# Patient Record
Sex: FEMALE | Race: AMERICAN INDIAN OR ALASKA NATIVE
[De-identification: names, ages, dates, MRNs, and addresses within clinical notes are randomized per-mention and may not be internally consistent; named-entity substitution may affect disease eponyms.]

---

## 2018-09-25 ENCOUNTER — HOSPITAL ENCOUNTER (INPATIENT)
Dept: HOSPITAL 5 - ED | Age: 48
LOS: 3 days | Discharge: HOME | DRG: 683 | End: 2018-09-28
Attending: INTERNAL MEDICINE | Admitting: INTERNAL MEDICINE
Payer: COMMERCIAL

## 2018-09-25 DIAGNOSIS — I34.1: ICD-10-CM

## 2018-09-25 DIAGNOSIS — I10: ICD-10-CM

## 2018-09-25 DIAGNOSIS — Z90.49: ICD-10-CM

## 2018-09-25 DIAGNOSIS — Z79.899: ICD-10-CM

## 2018-09-25 DIAGNOSIS — Z90.710: ICD-10-CM

## 2018-09-25 DIAGNOSIS — K59.00: ICD-10-CM

## 2018-09-25 DIAGNOSIS — Z79.82: ICD-10-CM

## 2018-09-25 DIAGNOSIS — Z79.84: ICD-10-CM

## 2018-09-25 DIAGNOSIS — N17.0: Primary | ICD-10-CM

## 2018-09-25 DIAGNOSIS — E86.9: ICD-10-CM

## 2018-09-25 DIAGNOSIS — R19.7: ICD-10-CM
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
## 2018-09-25 DIAGNOSIS — R65.10: ICD-10-CM

## 2018-09-25 DIAGNOSIS — N28.1: ICD-10-CM

## 2018-09-25 DIAGNOSIS — E24.9: ICD-10-CM

## 2018-09-25 DIAGNOSIS — T48.905A: ICD-10-CM

## 2018-09-25 LAB
ALBUMIN SERPL-MCNC: 4.1 G/DL (ref 3.9–5)
ALT SERPL-CCNC: 10 UNITS/L (ref 7–56)
BASOPHILS # (AUTO): 0.1 K/MM3 (ref 0–0.1)
BASOPHILS NFR BLD AUTO: 0.4 % (ref 0–1.8)
BUN SERPL-MCNC: 29 MG/DL (ref 7–17)
BUN/CREAT SERPL: 18 %
CALCIUM SERPL-MCNC: 9.4 MG/DL (ref 8.4–10.2)
EOSINOPHIL # BLD AUTO: 0.1 K/MM3 (ref 0–0.4)
EOSINOPHIL NFR BLD AUTO: 0.6 % (ref 0–4.3)
HCT VFR BLD CALC: 44.5 % (ref 30.3–42.9)
HEMOLYSIS INDEX: 13
HGB BLD-MCNC: 14.5 GM/DL (ref 10.1–14.3)
LYMPHOCYTES # BLD AUTO: 2.1 K/MM3 (ref 1.2–5.4)
LYMPHOCYTES NFR BLD AUTO: 14.9 % (ref 13.4–35)
MCH RBC QN AUTO: 29 PG (ref 28–32)
MCHC RBC AUTO-ENTMCNC: 33 % (ref 30–34)
MCV RBC AUTO: 88 FL (ref 79–97)
MONOCYTES # (AUTO): 0.6 K/MM3 (ref 0–0.8)
MONOCYTES % (AUTO): 4.3 % (ref 0–7.3)
PLATELET # BLD: 253 K/MM3 (ref 140–440)
RBC # BLD AUTO: 5.07 M/MM3 (ref 3.65–5.03)

## 2018-09-25 PROCEDURE — 84443 ASSAY THYROID STIM HORMONE: CPT

## 2018-09-25 PROCEDURE — 76770 US EXAM ABDO BACK WALL COMP: CPT

## 2018-09-25 PROCEDURE — 82962 GLUCOSE BLOOD TEST: CPT

## 2018-09-25 PROCEDURE — 82550 ASSAY OF CK (CPK): CPT

## 2018-09-25 PROCEDURE — 85025 COMPLETE CBC W/AUTO DIFF WBC: CPT

## 2018-09-25 PROCEDURE — 74176 CT ABD & PELVIS W/O CONTRAST: CPT

## 2018-09-25 PROCEDURE — 80053 COMPREHEN METABOLIC PANEL: CPT

## 2018-09-25 PROCEDURE — 96361 HYDRATE IV INFUSION ADD-ON: CPT

## 2018-09-25 PROCEDURE — 84300 ASSAY OF URINE SODIUM: CPT

## 2018-09-25 PROCEDURE — 82553 CREATINE MB FRACTION: CPT

## 2018-09-25 PROCEDURE — 36415 COLL VENOUS BLD VENIPUNCTURE: CPT

## 2018-09-25 PROCEDURE — 96375 TX/PRO/DX INJ NEW DRUG ADDON: CPT

## 2018-09-25 PROCEDURE — 81001 URINALYSIS AUTO W/SCOPE: CPT

## 2018-09-25 PROCEDURE — 89050 BODY FLUID CELL COUNT: CPT

## 2018-09-25 PROCEDURE — 80048 BASIC METABOLIC PNL TOTAL CA: CPT

## 2018-09-25 PROCEDURE — 93010 ELECTROCARDIOGRAM REPORT: CPT

## 2018-09-25 PROCEDURE — 74018 RADEX ABDOMEN 1 VIEW: CPT

## 2018-09-25 PROCEDURE — 82570 ASSAY OF URINE CREATININE: CPT

## 2018-09-25 PROCEDURE — 83690 ASSAY OF LIPASE: CPT

## 2018-09-25 PROCEDURE — 84484 ASSAY OF TROPONIN QUANT: CPT

## 2018-09-25 PROCEDURE — 93005 ELECTROCARDIOGRAM TRACING: CPT

## 2018-09-25 PROCEDURE — 87040 BLOOD CULTURE FOR BACTERIA: CPT

## 2018-09-25 PROCEDURE — 96374 THER/PROPH/DIAG INJ IV PUSH: CPT

## 2018-09-25 NOTE — CAT SCAN REPORT
FINAL REPORT



PROCEDURE:  CT ABDOMEN PELVIS WO CON



TECHNIQUE:  Computerized axial tomography of the abdomen and

pelvis was performed without intravenous contrast. This study is

performed without intravascular contrast material and its

sensitivity for abdominal and pelvic pathology, including

neoplasms, inflammation, abscess, free fluid, thrombosis,

arterial dissection and infarction, is reduced compared with a

contrast enhanced study. 



HISTORY:  n,v,d recent constipation 



COMPARISON:  No prior studies are available for comparison.



FINDINGS:  

Visualized lower thorax: There is asymmetry of the breast tissue,

possibly just incompletely imaged.



Liver: There is lobular somewhat ovoid low-attenuation at the

hepatic dome, measuring up to 2.5 centimeters, possibly a cyst.



Spleen: Normal size and attenuation.



Gallbladder and biliary system: Gallbladder is distended. The

common bile duct measures up to 7 millimeters in caliber.



Pancreas: Normal.



Adrenals: Normal.



Kidneys: 16 millimeter low-density lesion in the left is kidney

is likely a cyst.



GI tract: Air-fluid levels in the colon. Appendix is not

visualized. No bowel obstruction or inflammation.



Lymph nodes and mesentery: Normal.



Vasculature: IVC filter is present.



Bladder: Normal.



Reproductive organs: Uterus is not visualized.



Peritoneum: Small amount of fluid is seen in the pelvis.



Musculoskeletal structures: There is a healed fracture of the

right inferior pubic ramus. There is a compression fracture of

L1, which appears chronic 



Other: None.



IMPRESSION:  

Air-fluid levels in the colon can be seen with diarrhea producing

illness. No bowel obstruction or inflammation.



Gallbladder distention.



Asymmetric breast tissue. This could be due to incomplete imaging

of one of the breasts. Correlate clinically.

## 2018-09-25 NOTE — EMERGENCY DEPARTMENT REPORT
ED N/V/D HPI





- General


Chief complaint: Nausea/Vomiting/Diarrhea


Stated complaint: LOW BP/NAUSEA/VOMITING


Time Seen by Provider: 09/25/18 16:27


Source: patient, EMS


Mode of arrival: Stretcher


Limitations: No Limitations





- History of Present Illness


Initial comments: 





48-year-old female with a past medical history hypertension, mitral valve 

prolapse, Cushing syndrome, transverse myelitis, and previous surgical history 

of pituitary gland removal, hysterectomy, and appendectomy presents to the 

hospital complains of nausea, vomiting, diarrhea, and by mouth intolerance 

since 5 AM.  Patient states she suffers from chronic nausea and chronic 

constipation.  She has been told to have constipation with secondary to nerve 

dysfunction after her transverse myelitis infection in 2002.  Patient was seen 

at another ER 1 week ago for constipation and nausea without vomiting.  Had a 

CAT scan performed but can't remember if she received contrast. CT + for 

constipation. She felt better after Zofran and an enema.  For the last week she'

s been using MiraLAX, mag citrate, and Colace.  She also uses Zofran 

intermittently with minimal improvement has been trying over-the-counter nausea 

medication.  Upon EMS arrival patient was alert but hypotensive with a pressure 

80/50 in Accu-Chek of 120.  Patient denies melena, hematochezia, hematemesis, 

or fever.  She has generalized intermittent sharp and cramping abdominal pain 

worse with palpation.  She does have a GI doctor with last colonoscopy 2 years 

ago which was unremarkable as per patient.  Patient has a history of 

hypertension but has been noncompliant with her meds for several months.





- Related Data


 Home Medications











 Medication  Instructions  Recorded  Confirmed  Last Taken


 


Aspirin [Aspirin TAB] 325 mg PO QDAY #0 05/11/15 09/26/18 Unknown


 


AtorvaSTATin [Lipitor] 40 mg PO QHS #0 05/11/15 09/26/18 Unknown


 


Duloxetine HCl [DULoxetine] 60 mg PO DAILY #0 05/11/15 09/26/18 Unknown


 


Furosemide [Lasix TAB] 40 mg PO QDAY PRN #0 05/11/15 09/26/18 Unknown


 


Iron Fum,Ps/Folic/Bcomp,C No.9 1 each PO DAILY #0 05/11/15 09/26/18 Unknown





[Integra Plus Capsule]    


 


Levothyroxine [Synthroid] 100 mcg PO QAM #0 05/11/15 09/26/18 Unknown


 


Metformin HCl 1,000 mg PO DAILY #0 05/11/15 09/26/18 Unknown


 


Metoprolol [Lopressor TAB] 50 mg PO BID #0 05/11/15 09/26/18 Unknown


 


Omeprazole 40 mg PO DAILY #0 05/11/15 09/26/18 Unknown


 


Pregabalin [Lyrica] 100 mg PO TID #0 05/11/15 09/26/18 Unknown











 Allergies











Allergy/AdvReac Type Severity Reaction Status Date / Time


 


No Known Allergies Allergy   Unverified 05/11/15 14:49














ED Review of Systems


ROS: 


Stated complaint: LOW BP/NAUSEA/VOMITING


Other details as noted in HPI





Comment: All other systems reviewed and negative





ED Past Medical Hx





- Past Medical History


Previous Medical History?: Yes


Hx Hypertension: Yes


Additional medical history: MVP, Cushings, Tranverse Myelitis,





- Surgical History


Hx Pacemaker: No


Hx Appendectomy: Yes


Additional Surgical History: Pituitary Gland, hysterectomy





- Social History


Smoking Status: Never Smoker


Substance Use Type: None





- Medications


Home Medications: 


 Home Medications











 Medication  Instructions  Recorded  Confirmed  Last Taken  Type


 


Aspirin [Aspirin TAB] 325 mg PO QDAY #0 05/11/15 09/26/18 Unknown History


 


AtorvaSTATin [Lipitor] 40 mg PO QHS #0 05/11/15 09/26/18 Unknown History


 


Duloxetine HCl [DULoxetine] 60 mg PO DAILY #0 05/11/15 09/26/18 Unknown History


 


Furosemide [Lasix TAB] 40 mg PO QDAY PRN #0 05/11/15 09/26/18 Unknown History


 


Iron Fum,Ps/Folic/Bcomp,C No.9 1 each PO DAILY #0 05/11/15 09/26/18 Unknown 

History





[Integra Plus Capsule]     


 


Levothyroxine [Synthroid] 100 mcg PO QAM #0 05/11/15 09/26/18 Unknown History


 


Metformin HCl 1,000 mg PO DAILY #0 05/11/15 09/26/18 Unknown History


 


Metoprolol [Lopressor TAB] 50 mg PO BID #0 05/11/15 09/26/18 Unknown History


 


Omeprazole 40 mg PO DAILY #0 05/11/15 09/26/18 Unknown History


 


Pregabalin [Lyrica] 100 mg PO TID #0 05/11/15 09/26/18 Unknown History














ED Physical Exam





- General


Limitations: No Limitations





- Other


Other exam information: 





General: No limitations, patient is alert in no acute distress


Head exam: Atraumatic, normocephalic


Eyes exam: Normal appearance, pupils equal reactive to light, extraocular 

movements intact


ENT: Moist mucous membrane, normal oropharynx


Neck exam: Normal inspection, full range of motion, no meningismus nontender


Respiratory exam: Clear to auscultation bilateral, no wheezes, rales, crackles


Cardiovascular: Normal rate and rhythm, normal heart sounds


Abdomen: Soft, nondistended, generalized abdominal tenderness, with normal 

bowel sounds, no rebound, or guarding


Extremity: Full range of motion normal inspection no deformity


Back: Normal Inspection, full range of motion, no tenderness


Neurologic: Alert, oriented x3, cranial nerves intact, no motor or sensory 

deficit


Psychiatric: normal affect, normal mood


Skin: Warm, dry, intact





ED Course


 Vital Signs











  09/25/18 09/25/18 09/25/18





  16:14 17:30 17:48


 


Temperature 97.7 F  97.8 F


 


Pulse Rate 103 H  85


 


Respiratory 16 16 18





Rate   


 


Blood Pressure 108/62  


 


Blood Pressure   93/57





[Right]   


 


O2 Sat by Pulse 97 98 95





Oximetry   














  09/25/18 09/25/18 09/25/18





  18:44 20:18 20:30


 


Temperature   


 


Pulse Rate 81  


 


Respiratory 16  





Rate   


 


Blood Pressure   88/68


 


Blood Pressure 91/62  





[Right]   


 


O2 Sat by Pulse 97 82 L 100





Oximetry   














  09/25/18 09/25/18 09/25/18





  20:45 21:00 21:15


 


Temperature   


 


Pulse Rate   


 


Respiratory   





Rate   


 


Blood Pressure 92/58 92/58 92/56


 


Blood Pressure   





[Right]   


 


O2 Sat by Pulse 93 92 95





Oximetry   














  09/25/18 09/25/18 09/25/18





  21:30 21:45 22:00


 


Temperature   


 


Pulse Rate   


 


Respiratory   





Rate   


 


Blood Pressure 92/56 99/54 99/54


 


Blood Pressure   





[Right]   


 


O2 Sat by Pulse 93 92 95





Oximetry   














  09/25/18 09/25/18 09/25/18





  22:15 22:30 22:45


 


Temperature   


 


Pulse Rate   


 


Respiratory   





Rate   


 


Blood Pressure   90/50


 


Blood Pressure   





[Right]   


 


O2 Sat by Pulse 99 94 94





Oximetry   














  09/25/18 09/25/18 09/25/18





  23:00 23:15 23:30


 


Temperature   


 


Pulse Rate   


 


Respiratory 16  





Rate   


 


Blood Pressure 90/50  84/54


 


Blood Pressure 88/46  





[Right]   


 


O2 Sat by Pulse 95 97 





Oximetry   














  09/25/18 09/25/18 09/26/18





  23:45 23:59 00:00


 


Temperature   


 


Pulse Rate   


 


Respiratory   





Rate   


 


Blood Pressure 105/47 105/47 90/56


 


Blood Pressure   





[Right]   


 


O2 Sat by Pulse 92 96 93





Oximetry   














  09/26/18 09/26/18 09/26/18





  00:10 00:20 00:30


 


Temperature   


 


Pulse Rate   


 


Respiratory   





Rate   


 


Blood Pressure 84/54 96/52 91/56


 


Blood Pressure   





[Right]   


 


O2 Sat by Pulse 93 95 99





Oximetry   














ED Medical Decision Making





- Lab Data


Result diagrams: 


 09/25/18 17:24





 09/26/18 06:09








 Lab Results











  09/25/18 09/25/18 Range/Units





  17:24 17:24 


 


WBC  14.0 H   (4.5-11.0)  K/mm3


 


RBC  5.07 H   (3.65-5.03)  M/mm3


 


Hgb  14.5 H   (10.1-14.3)  gm/dl


 


Hct  44.5 H   (30.3-42.9)  %


 


MCV  88   (79-97)  fl


 


MCH  29   (28-32)  pg


 


MCHC  33   (30-34)  %


 


RDW  14.7   (13.2-15.2)  %


 


Plt Count  253   (140-440)  K/mm3


 


Lymph % (Auto)  14.9   (13.4-35.0)  %


 


Mono % (Auto)  4.3   (0.0-7.3)  %


 


Eos % (Auto)  0.6   (0.0-4.3)  %


 


Baso % (Auto)  0.4   (0.0-1.8)  %


 


Lymph #  2.1   (1.2-5.4)  K/mm3


 


Mono #  0.6   (0.0-0.8)  K/mm3


 


Eos #  0.1   (0.0-0.4)  K/mm3


 


Baso #  0.1   (0.0-0.1)  K/mm3


 


Seg Neutrophils %  79.8 H   (40.0-70.0)  %


 


Seg Neutrophils #  11.2 H   (1.8-7.7)  K/mm3


 


Sodium   145  (137-145)  mmol/L


 


Potassium   4.0  (3.6-5.0)  mmol/L


 


Chloride   107.7 H  ()  mmol/L


 


Carbon Dioxide   25  (22-30)  mmol/L


 


Anion Gap   16  mmol/L


 


BUN   29 H  (7-17)  mg/dL


 


Creatinine   1.6 H  (0.7-1.2)  mg/dL


 


Estimated GFR   42  ml/min


 


BUN/Creatinine Ratio   18  %


 


Glucose   86  ()  mg/dL


 


Calcium   9.4  (8.4-10.2)  mg/dL


 


Total Bilirubin   0.40  (0.1-1.2)  mg/dL


 


AST   19  (5-40)  units/L


 


ALT   10  (7-56)  units/L


 


Alkaline Phosphatase   49  ()  units/L


 


Total Creatine Kinase   145 H  ()  units/L


 


CK-MB (CK-2)   2.5  (0.0-4.0)  ng/mL


 


CK-MB (CK-2) Rel Index   1.7  (0-4)  


 


Troponin T   < 0.010  (0.00-0.029)  ng/mL


 


Total Protein   7.2  (6.3-8.2)  g/dL


 


Albumin   4.1  (3.9-5)  g/dL


 


Albumin/Globulin Ratio   1.3  %














- Radiology Data


Radiology results: report reviewed








FINAL REPORT 





PROCEDURE: CT ABDOMEN PELVIS WO CON 





TECHNIQUE: Computerized axial tomography of the abdomen and 


pelvis was performed without intravenous contrast. This study is 


performed without intravascular contrast material and its 


sensitivity for abdominal and pelvic pathology, including 


neoplasms, inflammation, abscess, free fluid, thrombosis, 


arterial dissection and infarction, is reduced compared with a 


contrast enhanced study. 





HISTORY: n,v,d recent constipation 





COMPARISON: No prior studies are available for comparison. 





FINDINGS: 


Visualized lower thorax: There is asymmetry of the breast tissue, 


possibly just incompletely imaged. 





Liver: There is lobular somewhat ovoid low-attenuation at the 


hepatic dome, measuring up to 2.5 centimeters, possibly a cyst. 





Spleen: Normal size and attenuation. 





Gallbladder and biliary system: Gallbladder is distended. The 


common bile duct measures up to 7 millimeters in caliber. 





Pancreas: Normal. 





Adrenals: Normal. 





Kidneys: 16 millimeter low-density lesion in the left is kidney 


is likely a cyst. 





GI tract: Air-fluid levels in the colon. Appendix is not 


visualized. No bowel obstruction or inflammation. 





Lymph nodes and mesentery: Normal. 





Vasculature: IVC filter is present. 





Bladder: Normal. 





Reproductive organs: Uterus is not visualized. 





Peritoneum: Small amount of fluid is seen in the pelvis. 





Musculoskeletal structures: There is a healed fracture of the 


right inferior pubic ramus. There is a compression fracture of 


L1, which appears chronic 





Other: None. 





IMPRESSION: 


Air-fluid levels in the colon can be seen with diarrhea producing 


illness. No bowel obstruction or inflammation. 





Gallbladder distention. 





Asymmetric breast tissue. This could be due to incomplete imaging 


of one of the breasts. Correlate clinically. 








- Medical Decision Making





BP improved with IV fluids but systolic drop below 100 after morphine.  MAP 

remains above 65.  Additional normal saline ordered.  Vomiting controlled with 

Zofran.  Patient denies history of renal insufficiency status kidney problems 

and therefore likely has new onset renal insufficiency possibly due to acute 

dehydration from vomiting loss.  Patient is unsure if she received IV contrast.

  Patient signed out to Dr. Humphrey to follow-up CT and then to admit patient to 

hospitalist unless she is a candidate for surgical admission





- Differential Diagnosis


gastroenteritis, colitis, dehydration, anemia, sepsis


Critical Care Time: No


Critical care attestation.: 


If time is entered above; I have spent that time in minutes in the direct care 

of this critically ill patient, excluding procedure time.








ED Disposition


Clinical Impression: 


 Vomiting, Acute diarrhea, Stimulant laxative causing adverse effect in 

therapeutic use, Acute dehydration, Acute renal insufficiency





Disposition: DC-09 OP ADMIT IP TO THIS HOSP


Is pt being admited?: Yes


Condition: Stable


Time of Disposition: 19:56 (s/o to Dr Humphrey f/u ct, admit)

## 2018-09-26 LAB
BUN SERPL-MCNC: 24 MG/DL (ref 7–17)
BUN/CREAT SERPL: 15 %
CALCIUM SERPL-MCNC: 8 MG/DL (ref 8.4–10.2)
HEMOLYSIS INDEX: 4

## 2018-09-26 RX ADMIN — PREGABALIN SCH MG: 25 CAPSULE ORAL at 21:50

## 2018-09-26 RX ADMIN — SODIUM CHLORIDE SCH MLS/HR: 0.9 INJECTION, SOLUTION INTRAVENOUS at 01:20

## 2018-09-26 RX ADMIN — HEPARIN SODIUM SCH UNIT: 5000 INJECTION, SOLUTION INTRAVENOUS; SUBCUTANEOUS at 12:51

## 2018-09-26 RX ADMIN — ASPIRIN SCH MG: 325 TABLET, COATED ORAL at 12:47

## 2018-09-26 RX ADMIN — METRONIDAZOLE SCH MLS/HR: 5 INJECTION, SOLUTION INTRAVENOUS at 22:03

## 2018-09-26 RX ADMIN — PREGABALIN SCH: 25 CAPSULE ORAL at 12:50

## 2018-09-26 RX ADMIN — LORATADINE SCH MG: 10 TABLET ORAL at 23:02

## 2018-09-26 RX ADMIN — HEPARIN SODIUM SCH UNIT: 5000 INJECTION, SOLUTION INTRAVENOUS; SUBCUTANEOUS at 22:02

## 2018-09-26 RX ADMIN — ACETAMINOPHEN PRN MG: 325 TABLET ORAL at 01:22

## 2018-09-26 RX ADMIN — PREGABALIN SCH MG: 25 CAPSULE ORAL at 13:00

## 2018-09-26 RX ADMIN — ONDANSETRON PRN MG: 2 INJECTION INTRAMUSCULAR; INTRAVENOUS at 18:10

## 2018-09-26 RX ADMIN — ACETAMINOPHEN PRN MG: 325 TABLET ORAL at 12:46

## 2018-09-26 RX ADMIN — PREGABALIN SCH: 75 CAPSULE ORAL at 12:50

## 2018-09-26 RX ADMIN — SODIUM CHLORIDE SCH MLS/HR: 0.9 INJECTION, SOLUTION INTRAVENOUS at 11:27

## 2018-09-26 RX ADMIN — LEVOTHYROXINE SODIUM SCH MCG: 0.1 TABLET ORAL at 06:05

## 2018-09-26 RX ADMIN — PANTOPRAZOLE SODIUM SCH MG: 40 TABLET, DELAYED RELEASE ORAL at 12:48

## 2018-09-26 RX ADMIN — DULOXETINE HYDROCHLORIDE SCH MG: 30 CAPSULE, DELAYED RELEASE ORAL at 12:51

## 2018-09-26 RX ADMIN — PREGABALIN SCH MG: 75 CAPSULE ORAL at 21:50

## 2018-09-26 RX ADMIN — PREGABALIN SCH MG: 75 CAPSULE ORAL at 13:00

## 2018-09-26 NOTE — HISTORY AND PHYSICAL REPORT
CHIEF COMPLAINT:  Nausea, vomiting and diarrhea.



HISTORY OF PRESENTING ILLNESS:  The patient is a 48-year-old female who has been

having nausea, vomiting and diarrhea going on for some hours.  The patient says

she suffers from chronic nausea and chronic constipation and that her

constipation is due to nerve dysfunction that followed transverse myelitis that

she had in 2002.  The patient was seen in another Emergency Room about 1 week

ago for constipation and also nausea and says she had a CAT scan done, but does

not remember the results.  She felt better after she had Zofran and an enema and

then has been using MiraLax, magnesium citrate and Colace for treating her

constipation.  Also the patient uses Zofran intermittently with minimal

improvement and has been trying other over-the-counter medications.  EMS was

called and when EMS arrived, they found the patient to have low blood pressure

of about 80/50 and Accu-Chek was about 120.  There is no history of chest pain,

no history of shortness of breath, fever or chills.  The patient had

unremarkable colonoscopy about 2 years ago.  The patient gives a history of

hypertension with noncompliance with medications.



PAST MEDICAL HISTORY:  Pertinent for hypertension, mitral valve prolapse,

Cushing syndrome, transverse myelitis.



PAST SURGICAL HISTORY:  Pertinent for appendectomy, pituitary gland surgery,

hysterectomy.



FAMILY HISTORY:  Family history is noncontributory.



SOCIAL HISTORY:  The patient does not smoke, does not drink alcohol and does not

use illicit drugs.



MEDICATIONS:  The patient is on aspirin 325 mg by mouth daily, atorvastatin 40

mg by mouth daily, duloxetine 60 mg by mouth daily, Lasix 40 mg by mouth as

needed.  Iron and vitamin B and folic acid 125 mg by mouth daily.  Levothyroxine

100 mcg by mouth daily, Lyrica 100 mg by mouth 3 times daily, metoprolol 50 mg

by mouth daily, omeprazole 40 mg by mouth daily, metformin 1000 mg by mouth

twice daily.



ALLERGIES:  There are no known drug allergies.



REVIEW OF SYSTEMS:

CONSTITUTIONAL:  There is no fever, no chills, no diaphoresis.

HEENT:  There is no headache or sore throat.

CARDIOVASCULAR SYSTEM:  There is no chest pain or orthopnea.

RESPIRATORY SYSTEM:  There is no shortness of breath or cough.

GASTROINTESTINAL SYSTEM:  Abdominal discomfort noted.  Nausea, vomiting and

diarrhea, noted.  Constipation also noted.

NEUROLOGICAL SYSTEM:  There is no numbness, no dizziness, no altered mental

status.

MUSCULOSKELETAL SYSTEM:  There is no joint pain or swelling.

DERMATOLOGICAL SYSTEM:  There is no skin rash or itching.

GENITOURINARY SYSTEM:  There is no dysuria, hematuria or flank pain.

Rest of system review is normal.



PHYSICAL EXAMINATION:

GENERAL:  At the time of exam, the patient was found to be alert, oriented x 3,

not in acute distress.

VITAL SIGNS:  At the initial time of presentation showed temperature of 97.7

degrees Fahrenheit, pulse of 103, respiration 16, blood pressure 108/62, O2 sat

of 97% on room air.

HEENT:  Showed pupils to be equal, round, reactive to light and accommodating. 

Extraocular muscles are intact.

NECK:  Neck is supple with no JVD or carotid bruit.

CARDIOVASCULAR SYSTEM:  Showed normal first and second heart sounds with no

gallops or murmurs.

RESPIRATORY SYSTEM:  Showed good air entry on both sides of the lungs with no

abnormal breath sounds.

GASTROINTESTINAL SYSTEM:  Showed abdomen to be full, soft, nontender with no

organomegaly or rigidity.

NEUROLOGICAL:  Neuro exam shows no focal deficit.

MUSCULOSKELETAL SYSTEM:  Showed no joint swelling or tenderness.

DERMATOLOGICAL SYSTEM:  Showed no skin rash.

GENITOURINARY SYSTEM:  Showing no costovertebral angle tenderness.



PERTINENT LABORATORY DATA AND IMAGING STUDIES:  The patient has CT of the

abdomen and pelvis done without contrast and the result shows an air fluid level

in the colon which the radiologist say can be seen with diarrhea producing

illnesses.  There is no finding of any bowel obstruction or inflammation.  There

is distention of the gallbladder and also there is finding of asymmetric breast

tissue.  This could be due to incomplete imaging of one of the breast and said

to correlate clinically.



Lab results; the patient has CBC done with elevated white count of 14,000, high

hemoglobin of 14.5 and high hematocrit of 44.5 with CBC differential showing

elevated segmented neutrophil count of 79.8%.  The patient's chemistry show a

high chloride level of 107.7 with normal sodium and normal potassium level and

the patient's BUN was elevated with a value of 29 as well as elevation in

creatinine with a value of 1.6 and decreased GFR of 42.  Rest of the patient's

chemistry were unremarkable.



DIAGNOSES:

1.  Acute gastroenteritis.

2. Hypotension

3.  Acute kidney injury.



PLAN OF ACTION:

1.  The patient will be admitted to medical/surgical floor.

2.  The patient will have Nephrology consult with Dr. Durant to manage acute

kidney injury.

3.  The patient will be on normal saline at the rate of 125 mL an hour

intravenously.

4.  The patient will have Accu-Chek a.c. and at bedtime followed by low dose

sliding scale using regular insulin.

5.  The patient will have basic metabolic panel check this morning and diet will

be consistent carbohydrate diet.

6 . The patient will be on IV Zofran 4 mg every 8 hours for nausea and vomiting

and will be placed back on home medication as shown in the medication

reconciliation section.





DD: 09/26/2018 05:20

DT: 09/26/2018 07:22

JOB# [id number]  [id number]

OCN/BRIANA

MTDD

## 2018-09-26 NOTE — CONSULTATION
History of Present Illness





- Reason for Consult


Consult date: 09/26/18


acute renal failure


Requesting physician: ESAU GOMEZ





- History of Present Illness





48-year-old female with a past medical history hypertension, mitral valve 

prolapse, Cushing syndrome, transverse myelitis, and previous surgical history 

of pituitary gland removal, hysterectomy, and appendectomy presents to the 

hospital complains of nausea, vomiting, diarrhea, and by mouth intolerance 

since 5 AM.  Patient states she suffers from chronic nausea and chronic 

constipation.  She has been told to have constipation with secondary to nerve 

dysfunction after her transverse myelitis infection in 2002.  Patient was seen 

at another ER 1 week ago for constipation and nausea without vomiting.  Had a 

CAT scan performed but can't remember if she received contrast.  She felt 

better after Zofran and an enema.  For the last week she's been using MiraLAX, 

mag citrate, and Colace.  She also uses Zofran intermittently with minimal 

improvement has been trying over-the-counter nausea medication.  Upon EMS 

arrival patient was alert but hypotensive with a pressure 80/50 in Accu-Chek of 

120.  Patient denies melena, hematochezia, hematemesis, or fever.  She has 

generalized intermittent sharp and cramping abdominal pain worse with 

palpation.  She does have a GI doctor with last colonoscopy 2 years ago which 

was unremarkable as per patient.  Patient is a history of hypertension but has 

been noncompliant with her meds for several months.





ROS: 


Stated complaint: LOW BP/NAUSEA/VOMITING


Other details as noted in HPI





Comment: All other systems reviewed and negative





- Past Medical History


Previous Medical History?: Yes


Hx Hypertension: Yes


Additional medical history: MVP, Cushings, Tranverse Myelitis,





- Surgical History


Hx Pacemaker: No


Hx Appendectomy: Yes


Additional Surgical History: Pituitary Gland, hysterectomy





- Social History


Smoking Status: Never Smoker


Substance Use Type: None








Medications and Allergies


 Allergies











Allergy/AdvReac Type Severity Reaction Status Date / Time


 


No Known Allergies Allergy   Unverified 05/11/15 14:49











 Home Medications











 Medication  Instructions  Recorded  Confirmed  Last Taken  Type


 


Aspirin [Aspirin TAB] 325 mg PO QDAY #0 05/11/15 09/26/18 Unknown History


 


AtorvaSTATin [Lipitor] 40 mg PO QHS #0 05/11/15 09/26/18 Unknown History


 


Duloxetine HCl [DULoxetine] 60 mg PO DAILY #0 05/11/15 09/26/18 Unknown History


 


Furosemide [Lasix TAB] 40 mg PO QDAY PRN #0 05/11/15 09/26/18 Unknown History


 


Iron Fum,Ps/Folic/Bcomp,C No.9 1 each PO DAILY #0 05/11/15 09/26/18 Unknown 

History





[Integra Plus Capsule]     


 


Levothyroxine [Synthroid] 100 mcg PO QAM #0 05/11/15 09/26/18 Unknown History


 


Metformin HCl 1,000 mg PO DAILY #0 05/11/15 09/26/18 Unknown History


 


Metoprolol [Lopressor TAB] 50 mg PO BID #0 05/11/15 09/26/18 Unknown History


 


Omeprazole 40 mg PO DAILY #0 05/11/15 09/26/18 Unknown History


 


Pregabalin [Lyrica] 100 mg PO TID #0 05/11/15 09/26/18 Unknown History











Active Meds: 


Active Medications





Acetaminophen (Tylenol)  650 mg PO Q4H PRN


   PRN Reason: Fever >101


   Last Admin: 09/26/18 01:22 Dose:  650 mg


Aspirin (Ecotrin)  325 mg PO DAILY [facility]


Atorvastatin Calcium (Lipitor)  40 mg PO DAILY [facility]


Dextrose (D50w (25gm) Syringe)  50 ml IV PRN PRN


   PRN Reason: Hypoglycemia


Duloxetine HCl (Cymbalta)  60 mg PO DAILY [facility]


Heparin Sodium (Porcine) (Heparin)  5,000 unit SUB-Q Q12HR [facility]


Sodium Chloride (Nacl 0.9% 1000 Ml)  1,000 mls @ 150 mls/hr IV AS DIRECT REUBEN


   Last Admin: 09/26/18 01:20 Dose:  150 mls/hr


Insulin Human Regular (Humulin R)  0 units SUB-Q [facility]; Protocol


Insulin Human Regular (Humulin R)  0 units SUB-Q QHS [facility]; Protocol


Levothyroxine Sodium (Synthroid)  100 mcg PO DAILY@0600 [facility]


   Last Admin: 09/26/18 06:05 Dose:  100 mcg


Miscellaneous Medication (Iron Fum & P/Fa/Vit B & C No.9)  125 mg PO DAILY [facility]


Ondansetron HCl (Zofran)  4 mg IV Q8H PRN


   PRN Reason: Nausea And Vomiting


Pantoprazole Sodium (Protonix)  40 mg PO DAILY [facility]


Pregabalin (Lyrica)  25 mg PO TID [facility]


Pregabalin (Lyrica)  75 mg PO TID [facility]











Exam





- Vital Signs


Vital signs: 


 Vital Signs











Temp Pulse Resp BP Pulse Ox


 


 97.7 F   103 H  16   108/62   97 


 


 09/25/18 16:14  09/25/18 16:14  09/25/18 16:14  09/25/18 16:14  09/25/18 16:14














- Physical Exam


Narrative exam: 





General: No limitations, patient is alert in no acute distress


Head exam: Atraumatic, normocephalic


Eyes exam: Normal appearance, pupils equal reactive to light, extraocular 

movements intact


ENT: Moist mucous membrane, normal oropharynx


Neck exam: Normal inspection, full range of motion, no meningismus nontender


Respiratory exam: Clear to auscultation bilateral, no wheezes, rales, crackles


Cardiovascular: Normal rate and rhythm, normal heart sounds


Abdomen: Soft, nondistended, generalized abdominal tenderness, with normal 

bowel sounds, no rebound, or guarding


Extremity: Full range of motion normal inspection no deformity


Back: Normal Inspection, full range of motion, no tenderness


Neurologic: Alert, oriented x3, cranial nerves intact, no motor or sensory 

deficit


Psychiatric: normal affect, normal mood


Skin: Warm, dry, intact





Results





- Lab Results





 09/25/18 17:24





 09/26/18 06:09


 Most recent lab results











Calcium  8.0 mg/dL (8.4-10.2)  L  09/26/18  06:09    














Assessment and Plan





Impression:


* MIKIE


* volume depletion


* N/V/D


* hypotension








Plan:


* ivf resuscitation


* strict i/os


* daily lytes


* avoid nephrotoxins


* mikie likely due to hypoperfusion and dehydration


* strict i/os

## 2018-09-26 NOTE — PROGRESS NOTE
Assessment and Plan


Assessment and plan: 


Patient is a 48-year-old woman with a history of hypertension, mitral valve 

prolapse, Cushing syndrome, transverse myelitis with chronic constipation, and 

previous surgical history of pituitary gland removal who pw N/V/D. She was 

admitted to telemetry due to hypotension. 





* CT ABDOMEN PELVIS WO CON IMPRESSION: Air-fluid levels in the colon can be 

seen with diarrhea producing illness. No bowel obstruction or inflammation. 

Gallbladder distention. Asymmetric breast tissue. This could be due to 

incomplete imaging of one of the breasts. Correlate clinically. 





ARF, vasomotor nephropathy with ATN: treat with IVF, Nephrology is following, 

renal u/s pending


Hypotension with volume depletion: treat with ivf


Diarrhea; check for c.diff


SIRS, with suspected sepsis due to bacterial colitis: start iv abx, consult GI


N/V/D with AGE: treat symptomatically





History


Interval history: 


Patient was seen and examined. Follow-up on current diagnosis of n/v/d. 

Overnight uneventful. Patient denies any chest pain, shortness breath, orsevere 

headaches. Imaging, nursing note, chart, labs and old chart reviewed. Discussed 

with patient. 





Hospitalist Physical





- Physical exam


Narrative exam: 


GEN: ill appearing,  NAD, Awake, Alert, Orientated 


HEENT: NCAT, EOMI, PERRL, OP Clear 


NECK: supple, no adenopathy, no thyromegaly, no JVD 


CVS/HEART: RRR, normal S1S2, pulses present bilaterally 


CHEST/LUNGS: CTA B, Symmetrical chest expansion, good air entry bilaterally 


GI/Abdomen: soft, NTND, good bowel sounds, no guarding or rebound 


/Bladder: no suprapubic tenderness, no CVA or paraspinal tenderness 


EXT/Skin: no c/c/e, no obvious rash 


MSK: FROM x 4 


Neuro: CN 2-12 grossly intact, no new focal deficits 


Psych: calm








- Constitutional


Vitals: 


 











Temp Pulse Resp BP Pulse Ox


 


 98.9 F   20 L  20   90/56   98 


 


 09/26/18 16:43  09/26/18 16:43  09/26/18 16:43  09/26/18 16:43  09/26/18 16:43














Results





- Labs


CBC & Chem 7: 


 09/25/18 17:24





 09/26/18 06:09


Labs: 


 Laboratory Last Values











WBC  14.0 K/mm3 (4.5-11.0)  H  09/25/18  17:24    


 


RBC  5.07 M/mm3 (3.65-5.03)  H  09/25/18  17:24    


 


Hgb  14.5 gm/dl (10.1-14.3)  H  09/25/18  17:24    


 


Hct  44.5 % (30.3-42.9)  H  09/25/18  17:24    


 


MCV  88 fl (79-97)   09/25/18  17:24    


 


MCH  29 pg (28-32)   09/25/18  17:24    


 


MCHC  33 % (30-34)   09/25/18  17:24    


 


RDW  14.7 % (13.2-15.2)   09/25/18  17:24    


 


Plt Count  253 K/mm3 (140-440)   09/25/18  17:24    


 


Lymph % (Auto)  14.9 % (13.4-35.0)   09/25/18  17:24    


 


Mono % (Auto)  4.3 % (0.0-7.3)   09/25/18  17:24    


 


Eos % (Auto)  0.6 % (0.0-4.3)   09/25/18  17:24    


 


Baso % (Auto)  0.4 % (0.0-1.8)   09/25/18  17:24    


 


Lymph #  2.1 K/mm3 (1.2-5.4)   09/25/18  17:24    


 


Mono #  0.6 K/mm3 (0.0-0.8)   09/25/18  17:24    


 


Eos #  0.1 K/mm3 (0.0-0.4)   09/25/18  17:24    


 


Baso #  0.1 K/mm3 (0.0-0.1)   09/25/18  17:24    


 


Seg Neutrophils %  79.8 % (40.0-70.0)  H  09/25/18  17:24    


 


Seg Neutrophils #  11.2 K/mm3 (1.8-7.7)  H  09/25/18  17:24    


 


Sodium  145 mmol/L (137-145)   09/26/18  06:09    


 


Potassium  4.0 mmol/L (3.6-5.0)   09/26/18  06:09    


 


Chloride  112.1 mmol/L ()  H  09/26/18  06:09    


 


Carbon Dioxide  22 mmol/L (22-30)   09/26/18  06:09    


 


Anion Gap  15 mmol/L  09/26/18  06:09    


 


BUN  24 mg/dL (7-17)  H  09/26/18  06:09    


 


Creatinine  1.6 mg/dL (0.7-1.2)  H  09/26/18  06:09    


 


Estimated GFR  42 ml/min  09/26/18  06:09    


 


BUN/Creatinine Ratio  15 %  09/26/18  06:09    


 


Glucose  78 mg/dL ()   09/26/18  06:09    


 


POC Glucose  79  ()   09/26/18  12:37    


 


Calcium  8.0 mg/dL (8.4-10.2)  L  09/26/18  06:09    


 


Total Bilirubin  0.40 mg/dL (0.1-1.2)   09/25/18  17:24    


 


AST  19 units/L (5-40)   09/25/18  17:24    


 


ALT  10 units/L (7-56)   09/25/18  17:24    


 


Alkaline Phosphatase  49 units/L ()   09/25/18  17:24    


 


Total Creatine Kinase  145 units/L ()  H  09/25/18  17:24    


 


CK-MB (CK-2)  2.5 ng/mL (0.0-4.0)   09/25/18  17:24    


 


CK-MB (CK-2) Rel Index  1.7  (0-4)   09/25/18  17:24    


 


Troponin T  < 0.010 ng/mL (0.00-0.029)   09/25/18  17:24    


 


Total Protein  7.2 g/dL (6.3-8.2)   09/25/18  17:24    


 


Albumin  4.1 g/dL (3.9-5)   09/25/18  17:24    


 


Albumin/Globulin Ratio  1.3 %  09/25/18  17:24    


 


Lipase  26 units/L (13-60)   09/25/18  17:24

## 2018-09-27 LAB
ALBUMIN SERPL-MCNC: 3.1 G/DL (ref 3.9–5)
ALT SERPL-CCNC: 9 UNITS/L (ref 7–56)
BACTERIA #/AREA URNS HPF: (no result) /HPF
BILIRUB UR QL STRIP: (no result)
BLOOD UR QL VISUAL: (no result)
BUN SERPL-MCNC: 16 MG/DL (ref 7–17)
BUN/CREAT SERPL: 10 %
CALCIUM SERPL-MCNC: 8 MG/DL (ref 8.4–10.2)
CREATININE,URINE: 79.9 MG/DL (ref 0.1–20)
HEMOLYSIS INDEX: 25
MUCOUS THREADS #/AREA URNS HPF: (no result) /HPF
PH UR STRIP: 5 [PH] (ref 5–7)
RBC #/AREA URNS HPF: < 1 /HPF (ref 0–6)
UROBILINOGEN UR-MCNC: < 2 MG/DL (ref ?–2)
WBC #/AREA URNS HPF: 1 /HPF (ref 0–6)

## 2018-09-27 RX ADMIN — Medication SCH EACH: at 09:14

## 2018-09-27 RX ADMIN — ASPIRIN SCH MG: 325 TABLET, COATED ORAL at 09:11

## 2018-09-27 RX ADMIN — SPIRONOLACTONE SCH MG: 25 TABLET ORAL at 23:20

## 2018-09-27 RX ADMIN — PREGABALIN SCH MG: 25 CAPSULE ORAL at 15:55

## 2018-09-27 RX ADMIN — METRONIDAZOLE SCH MLS/HR: 5 INJECTION, SOLUTION INTRAVENOUS at 15:53

## 2018-09-27 RX ADMIN — PREGABALIN SCH MG: 75 CAPSULE ORAL at 20:06

## 2018-09-27 RX ADMIN — HEPARIN SODIUM SCH UNIT: 5000 INJECTION, SOLUTION INTRAVENOUS; SUBCUTANEOUS at 09:11

## 2018-09-27 RX ADMIN — SODIUM CHLORIDE SCH MLS/HR: 0.9 INJECTION, SOLUTION INTRAVENOUS at 23:26

## 2018-09-27 RX ADMIN — PREGABALIN SCH MG: 25 CAPSULE ORAL at 20:06

## 2018-09-27 RX ADMIN — LORATADINE SCH MG: 10 TABLET ORAL at 09:11

## 2018-09-27 RX ADMIN — PREGABALIN SCH MG: 75 CAPSULE ORAL at 15:55

## 2018-09-27 RX ADMIN — DULOXETINE HYDROCHLORIDE SCH MG: 30 CAPSULE, DELAYED RELEASE ORAL at 09:11

## 2018-09-27 RX ADMIN — METRONIDAZOLE SCH MLS/HR: 5 INJECTION, SOLUTION INTRAVENOUS at 23:19

## 2018-09-27 RX ADMIN — PREGABALIN SCH MG: 75 CAPSULE ORAL at 09:11

## 2018-09-27 RX ADMIN — ONDANSETRON PRN MG: 2 INJECTION INTRAMUSCULAR; INTRAVENOUS at 18:40

## 2018-09-27 RX ADMIN — PREGABALIN SCH MG: 25 CAPSULE ORAL at 09:11

## 2018-09-27 RX ADMIN — HEPARIN SODIUM SCH UNIT: 5000 INJECTION, SOLUTION INTRAVENOUS; SUBCUTANEOUS at 23:19

## 2018-09-27 RX ADMIN — LEVOTHYROXINE SODIUM SCH MCG: 0.1 TABLET ORAL at 06:19

## 2018-09-27 RX ADMIN — PANTOPRAZOLE SODIUM SCH MG: 40 TABLET, DELAYED RELEASE ORAL at 09:11

## 2018-09-27 RX ADMIN — METRONIDAZOLE SCH MLS/HR: 5 INJECTION, SOLUTION INTRAVENOUS at 06:18

## 2018-09-27 NOTE — ULTRASOUND REPORT
ULTRASOUND RENAL BILATERAL



HISTORY: Renal failure.



TECHNIQUE: transabdominal ultrasound with color Doppler interrogation.



COMPARISON: none.



FINDINGS:

The right kidney measures 10.3cm. Right renal cortex: 1.2cm.



The left kidney measures 11.7cm.  Left renal cortex: 1.7cm.



The kidneys are normal size, contour and position.  There is increased 

renal cortical echotexture bilaterally consistent with nonspecific 

renal parenchymal disease.  Corticomedullary differentiation is 

preserved.



A 2.1 cm simple cyst is noted in the mid left kidney. No evidence for 

mass, nephrolithiasis, hydronephrosis or perinephric fluid.



The views of the bladder and the region of the ureters appear normal.



IMPRESSION:

Slightly echogenic kidneys consistent with nonspecific renal 

parenchymal disease. Simple left renal cyst. No obstructive uropathy.

## 2018-09-27 NOTE — PROGRESS NOTE
Assessment and Plan





Impression:


* MIKIE


* volume depletion


* N/V/D


* hypotension








Plan:


* ivf resuscitation


* strict i/os


* daily lytes


* avoid nephrotoxins


* mikie likely due to hypoperfusion and dehydration


* strict i/os





Subjective


Date of service: 09/27/18


Principal diagnosis: mikie


Interval history: 





resting in bed today





Objective





- Exam


Narrative Exam: 





General: No limitations, patient is alert in no acute distress


Head exam: Atraumatic, normocephalic


Eyes exam: Normal appearance, pupils equal reactive to light, extraocular 

movements intact


ENT: Moist mucous membrane, normal oropharynx


Neck exam: Normal inspection, full range of motion, no meningismus nontender


Respiratory exam: Clear to auscultation bilateral, no wheezes, rales, crackles


Cardiovascular: Normal rate and rhythm, normal heart sounds


Abdomen: Soft, nondistended, generalized abdominal tenderness, with normal 

bowel sounds, no rebound, or guarding


Extremity: Full range of motion normal inspection no deformity


Back: Normal Inspection, full range of motion, no tenderness


Neurologic: Alert, oriented x3, cranial nerves intact, no motor or sensory 

deficit


Psychiatric: normal affect, normal mood


Skin: Warm, dry, intact





- Vital Signs


Vital signs: 


 Vital Signs - 12hr











  09/27/18 09/27/18 09/27/18





  00:04 01:00 05:04


 


Temperature   


 


Pulse Rate  78 


 


Respiratory 18  18





Rate   


 


Blood Pressure 97/48  90/53


 


Blood Pressure   





[Right]   


 


O2 Sat by Pulse   





Oximetry   














  09/27/18





  08:44


 


Temperature 98.2 F


 


Pulse Rate 72


 


Respiratory 18





Rate 


 


Blood Pressure 


 


Blood Pressure 110/70





[Right] 


 


O2 Sat by Pulse 97





Oximetry 














- Lab





 09/25/18 17:24





 09/27/18 04:38


 Most recent lab results











Calcium  8.0 mg/dL (8.4-10.2)  L  09/27/18  04:38    


 


Urine Creatinine  79.9 mg/dL (0.1-20.0)  H  09/27/18  Unknown


 


Urine Sodium  57 mmol/L  09/27/18  Unknown

## 2018-09-27 NOTE — GASTROENTEROLOGY CONSULTATION
<NANCY WILLETT - Last Filed: 09/27/18 11:54>





History of Present Illness





- Reason for Consult


Consult date: 09/27/18


colitis


Requesting physician: BRISEIDA BELTRE





- History of Present Illness


Patient 49 y/o female with PMH of HTN, mitral valve prolapse, cushing syndrome, 

transverse myelitis, chronic N/V, and chronic constipation who presented to ED 

with c/o worsening of N/V, abd cramping, and diarrhea with associated PO 

intolerance x 1 day following the use of multiple laxatives (miralax, mag 

citrate, and colace) after an previous ER visit last week with CT scan showing 

constipation per pt report. CT this admission showed air-fluid levels in the 

colon most likey 2/2 diarrha producing illness but no evidence of inflammation 

or obstruction. This morning patient was resting in bed w/o acute distress. 

Reports diarrhea is now resolved and nausea has improved with zofran. No recent 

episodes of vomiting. Denies fever, CP, SOB, dysphagia, odynophagia, or signs 

of bleeding. Has chronic N/V and constipation due to multiple medical 

conditions with etiology of constipation thought to be 2/2 nerve dysfunction 

after transverse myelitis. She is followed by Dr. Bernardo at [place] (

gastroenterologist) with last colonoscopy approximately 2 years ago with 

negative results per pt. No recent travel, abx therapy, or known ill contacts. 








Past History


Past Medical History: hypertension, other (mitral valve prolapse, cushing 

syndrome, transverse myelitis, chronic N/V, and chronic constipation)


Past Surgical History: appendectomy, hysterectomy, Other (Pituitary Gland 

removal)


Social history: denies: smoking, alcohol abuse





Medications and Allergies


 Allergies











Allergy/AdvReac Type Severity Reaction Status Date / Time


 


No Known Allergies Allergy   Unverified 05/11/15 14:49











 Home Medications











 Medication  Instructions  Recorded  Confirmed  Last Taken  Type


 


Aspirin [Aspirin TAB] 325 mg PO QDAY #0 05/11/15 09/26/18 Unknown History


 


AtorvaSTATin [Lipitor] 40 mg PO QHS #0 05/11/15 09/26/18 Unknown History


 


Duloxetine HCl [DULoxetine] 60 mg PO DAILY #0 05/11/15 09/26/18 Unknown History


 


Furosemide [Lasix TAB] 40 mg PO QDAY PRN #0 05/11/15 09/26/18 Unknown History


 


Iron Fum,Ps/Folic/Bcomp,C No.9 1 each PO DAILY #0 05/11/15 09/26/18 Unknown 

History





[Integra Plus Capsule]     


 


Levothyroxine [Synthroid] 100 mcg PO QAM #0 05/11/15 09/26/18 Unknown History


 


Metformin HCl 1,000 mg PO DAILY #0 05/11/15 09/26/18 Unknown History


 


Metoprolol [Lopressor TAB] 50 mg PO BID #0 05/11/15 09/26/18 Unknown History


 


Omeprazole 40 mg PO DAILY #0 05/11/15 09/26/18 Unknown History


 


Pregabalin [Lyrica] 100 mg PO TID #0 05/11/15 09/26/18 Unknown History











Active Meds: 


Active Medications





Acetaminophen (Tylenol)  650 mg PO Q4H PRN


   PRN Reason: Fever >101


   Last Admin: 09/26/18 12:46 Dose:  650 mg


Aspirin (Ecotrin)  325 mg PO DAILY REUBEN


   Last Admin: 09/27/18 09:11 Dose:  325 mg


Atorvastatin Calcium (Lipitor)  40 mg PO DAILY REUBEN


   Last Admin: 09/27/18 09:11 Dose:  40 mg


Dextrose (D50w (25gm) Syringe)  50 ml IV PRN PRN


   PRN Reason: Hypoglycemia


Duloxetine HCl (Cymbalta)  60 mg PO DAILY REUBEN


   Last Admin: 09/27/18 09:11 Dose:  60 mg


Heparin Sodium (Porcine) (Heparin)  5,000 unit SUB-Q Q12HR REUBEN


   Last Admin: 09/27/18 09:11 Dose:  5,000 unit


Sodium Chloride (Nacl 0.9% 1000 Ml)  1,000 mls @ 150 mls/hr IV AS DIRECT REUBEN


   Last Admin: 09/26/18 11:27 Dose:  150 mls/hr


Levofloxacin/Dextrose (Levaquin 500mg/100ml)  500 mg in 100 mls @ 100 mls/hr IV 

Q48H REUBEN; Protocol


   Last Admin: 09/26/18 18:11 Dose:  100 mls/hr


Metronidazole (Flagyl 500 Mg/100 Ml)  500 mg in 100 mls @ 100 mls/hr IV Q8HR REUBEN

; Protocol


   Last Admin: 09/27/18 06:18 Dose:  100 mls/hr


Insulin Human Regular (Humulin R)  0 units SUB-Q AC [facility]; Protocol


   Last Admin: 09/27/18 08:01 Dose:  Not Given


Insulin Human Regular (Humulin R)  0 units SUB-Q QHS [facility]; Protocol


   Last Admin: 09/26/18 22:50 Dose:  Not Given


Levothyroxine Sodium (Synthroid)  100 mcg PO DAILY@0600 [facility]


   Last Admin: 09/27/18 06:19 Dose:  100 mcg


Loratadine (Claritin)  10 mg PO QDAY [facility]


   Last Admin: 09/27/18 09:11 Dose:  10 mg


Magnesium Hydroxide (Milk Of Magnesia)  30 ml PO Q4H PRN


   PRN Reason: Constipation


   Last Admin: 09/27/18 06:58 Dose:  30 ml


Multivitamins/Iron (Hemocyte Plus)  1 each PO QDAY [facility]


   Last Admin: 09/27/18 09:14 Dose:  1 each


Ondansetron HCl (Zofran)  4 mg IV Q8H PRN


   PRN Reason: Nausea And Vomiting


   Last Admin: 09/26/18 18:10 Dose:  4 mg


Oxymetazoline HCl (Afrin)  2 spray NS Q12H PRN


   PRN Reason: Nasal Congestion


   Last Admin: 09/27/18 06:27 Dose:  2 spray


Pantoprazole Sodium (Protonix)  40 mg PO DAILY [facility]


   Last Admin: 09/27/18 09:11 Dose:  40 mg


Pregabalin (Lyrica)  25 mg PO TID [facility]


   Last Admin: 09/27/18 09:11 Dose:  25 mg


Pregabalin (Lyrica)  75 mg PO TID [facility]


   Last Admin: 09/27/18 09:11 Dose:  75 mg











Review of Systems





- Review of Systems


All systems: negative


Gastrointestinal: nausea, vomiting, diarrhea





Exam





- Constitutional


Vital Signs: 


 











Temp Pulse Resp BP Pulse Ox


 


 98.2 F   72   18   110/70   97 


 


 09/27/18 08:44  09/27/18 08:44  09/27/18 08:44  09/27/18 08:44  09/27/18 08:44











General appearance: no acute distress, obese





- EENT


Eyes: PERRL, EOM intact


ENT: hearing intact





- Respiratory


Respiratory: bilateral: CTA





- Cardiovascular


Rhythm: regular


Heart Sounds: Present: S1 & S2





- Gastrointestinal


General gastrointestinal: Present: soft, non-tender, non-distended, normal 

bowel sounds





- Neurologic


Neurological: alert and oriented x3





- Labs


CBC & Chem 7: 


 09/25/18 17:24





 09/27/18 04:38


Lab Results: 


 Laboratory Results - last 24 hr











  09/26/18 09/26/18 09/26/18





  12:37 16:42 22:27


 


Sodium   


 


Potassium   


 


Chloride   


 


Carbon Dioxide   


 


Anion Gap   


 


BUN   


 


Creatinine   


 


Estimated GFR   


 


BUN/Creatinine Ratio   


 


Glucose   


 


POC Glucose  79  81  97


 


Calcium   


 


Total Bilirubin   


 


AST   


 


ALT   


 


Alkaline Phosphatase   


 


Total Protein   


 


Albumin   


 


Albumin/Globulin Ratio   


 


TSH   


 


Urine Color   


 


Urine Turbidity   


 


Urine pH   


 


Ur Specific Gravity   


 


Urine Protein   


 


Urine Glucose (UA)   


 


Urine Ketones   


 


Urine Blood   


 


Urine Nitrite   


 


Urine Bilirubin   


 


Urine Urobilinogen   


 


Ur Leukocyte Esterase   


 


Urine WBC (Auto)   


 


Urine RBC (Auto)   


 


U Epithel Cells (Auto)   


 


Urine Bacteria (Auto)   


 


Urine Mucus   


 


Urine Eosinophils   


 


Urine Creatinine   


 


Urine Sodium   














  09/27/18 09/27/18 09/27/18





  04:38 04:38 06:14


 


Sodium  145  


 


Potassium  4.2  


 


Chloride  114.3 H  


 


Carbon Dioxide  21 L  


 


Anion Gap  14  


 


BUN  16  


 


Creatinine  1.6 H  


 


Estimated GFR  42  


 


BUN/Creatinine Ratio  10  


 


Glucose  87  


 


POC Glucose    74


 


Calcium  8.0 L  


 


Total Bilirubin  0.20  


 


AST  16  


 


ALT  9  


 


Alkaline Phosphatase  40  


 


Total Protein  5.6 L D  


 


Albumin  3.1 L  


 


Albumin/Globulin Ratio  1.2  


 


TSH   3.160 


 


Urine Color   


 


Urine Turbidity   


 


Urine pH   


 


Ur Specific Gravity   


 


Urine Protein   


 


Urine Glucose (UA)   


 


Urine Ketones   


 


Urine Blood   


 


Urine Nitrite   


 


Urine Bilirubin   


 


Urine Urobilinogen   


 


Ur Leukocyte Esterase   


 


Urine WBC (Auto)   


 


Urine RBC (Auto)   


 


U Epithel Cells (Auto)   


 


Urine Bacteria (Auto)   


 


Urine Mucus   


 


Urine Eosinophils   


 


Urine Creatinine   


 


Urine Sodium   














  09/27/18 09/27/18 09/27/18





  Unknown Unknown Unknown


 


Sodium   


 


Potassium   


 


Chloride   


 


Carbon Dioxide   


 


Anion Gap   


 


BUN   


 


Creatinine   


 


Estimated GFR   


 


BUN/Creatinine Ratio   


 


Glucose   


 


POC Glucose   


 


Calcium   


 


Total Bilirubin   


 


AST   


 


ALT   


 


Alkaline Phosphatase   


 


Total Protein   


 


Albumin   


 


Albumin/Globulin Ratio   


 


TSH   


 


Urine Color  Yellow  


 


Urine Turbidity  Slightly-cloudy  


 


Urine pH  5.0  


 


Ur Specific Gravity  1.010  


 


Urine Protein  30 mg/dl  


 


Urine Glucose (UA)  Neg  


 


Urine Ketones  Neg  


 


Urine Blood  Neg  


 


Urine Nitrite  Neg  


 


Urine Bilirubin  Neg  


 


Urine Urobilinogen  < 2.0  


 


Ur Leukocyte Esterase  Neg  


 


Urine WBC (Auto)  1.0  


 


Urine RBC (Auto)  < 1.0  


 


U Epithel Cells (Auto)  3.0  


 


Urine Bacteria (Auto)  4+  


 


Urine Mucus  Few  


 


Urine Eosinophils    None seen


 


Urine Creatinine   79.9 H 


 


Urine Sodium   57 














Assessment and Plan


1.N/V (chronic)


-improved


-tolerating diet


-continue antiemetics and supportive care


2.diarrhea


-c-diff pending


-CT scan showed air-fluid levels in the colon (likely due to diarrhea) but no 

inflammation or bowel obstruction


-last colonoscopy 2 years ago with negative results per pt


-etiology- likely laxative induced given history (patient with h/o chronic 

constipation thought to be 2/2 nerve dysfunction after transverse myelitis who 

presented with c/o diarrhea after taking multiple laxatives at home (miralax, 

mag citrate, and colace) following ER visit last week with CT scan +constipation

) 


-clinically, patient reports diarrhea now resolved with no BM yesterday or 

today. Denies abd pain, signs of bleeding, or vomiting. Tolerating diet.


-continue empiric antibiotics x 3 days


-start on daily Miralax for chronic constipation


-continue supportive care


-recommend patient follow up with her gastroenterologist (Dr. Bernardo) upon 

discharge for management of chronic constipation and further recommendations 

for daily bowel regimen


-no further GI recommendations at this time


-will sign off, please call if needed





<KENDALL MILLS - Last Filed: 09/27/18 16:14>





Medications and Allergies


Active Meds: 


Active Medications





Acetaminophen (Tylenol)  650 mg PO Q4H PRN


   PRN Reason: Fever >101


   Last Admin: 09/26/18 12:46 Dose:  650 mg


Aspirin (Ecotrin)  325 mg PO DAILY REUBEN


   Last Admin: 09/27/18 09:11 Dose:  325 mg


Atorvastatin Calcium (Lipitor)  40 mg PO DAILY REUBEN


   Last Admin: 09/27/18 09:11 Dose:  40 mg


Dextrose (D50w (25gm) Syringe)  50 ml IV PRN PRN


   PRN Reason: Hypoglycemia


Duloxetine HCl (Cymbalta)  60 mg PO DAILY REUBEN


   Last Admin: 09/27/18 09:11 Dose:  60 mg


Heparin Sodium (Porcine) (Heparin)  5,000 unit SUB-Q Q12HR REUBEN


   Last Admin: 09/27/18 09:11 Dose:  5,000 unit


Sodium Chloride (Nacl 0.9% 1000 Ml)  1,000 mls @ 150 mls/hr IV AS DIRECT REUBEN


   Last Admin: 09/26/18 11:27 Dose:  150 mls/hr


Metronidazole (Flagyl 500 Mg/100 Ml)  500 mg in 100 mls @ 100 mls/hr IV Q8HR REUBEN

; Protocol


   Last Admin: 09/27/18 15:53 Dose:  100 mls/hr


Levofloxacin/Dextrose (Levaquin 500mg/100ml)  500 mg in 100 mls @ 100 mls/hr IV 

Q24H REUBEN; Protocol


Insulin Human Regular (Humulin R)  0 units SUB-Q AC [facility]; Protocol


   Last Admin: 09/27/18 12:00 Dose:  Not Given


Insulin Human Regular (Humulin R)  0 units SUB-Q QHS [facility]; Protocol


   Last Admin: 09/26/18 22:50 Dose:  Not Given


Levothyroxine Sodium (Synthroid)  100 mcg PO DAILY@0600 [facility]


   Last Admin: 09/27/18 06:19 Dose:  100 mcg


Loratadine (Claritin)  10 mg PO QDAY [facility]


   Last Admin: 09/27/18 09:11 Dose:  10 mg


Magnesium Hydroxide (Milk Of Magnesia)  30 ml PO Q4H PRN


   PRN Reason: Constipation


   Last Admin: 09/27/18 06:58 Dose:  30 ml


Multivitamins/Iron (Hemocyte Plus)  1 each PO QDAY [facility]


   Last Admin: 09/27/18 09:14 Dose:  1 each


Ondansetron HCl (Zofran)  4 mg IV Q8H PRN


   PRN Reason: Nausea And Vomiting


   Last Admin: 09/26/18 18:10 Dose:  4 mg


Oxymetazoline HCl (Afrin)  2 spray NS Q12H PRN


   PRN Reason: Nasal Congestion


   Last Admin: 09/27/18 06:27 Dose:  2 spray


Pantoprazole Sodium (Protonix)  40 mg PO DAILY [facility]


   Last Admin: 09/27/18 09:11 Dose:  40 mg


Pregabalin (Lyrica)  25 mg PO TID [facility]


   Last Admin: 09/27/18 15:55 Dose:  25 mg


Pregabalin (Lyrica)  75 mg PO TID [facility]


   Last Admin: 09/27/18 15:55 Dose:  75 mg











Exam





- Constitutional


Vital Signs: 


 











Temp Pulse Resp BP Pulse Ox


 


 98.3 F   79   18   121/75   97 


 


 09/27/18 11:28  09/27/18 11:28  09/27/18 11:28  09/27/18 11:28  09/27/18 11:28














- Labs


CBC & Chem 7: 


 09/25/18 17:24





 09/27/18 04:38


Lab Results: 


 Laboratory Results - last 24 hr











  09/26/18 09/26/18 09/27/18





  16:42 22:27 04:38


 


Sodium    145


 


Potassium    4.2


 


Chloride    114.3 H


 


Carbon Dioxide    21 L


 


Anion Gap    14


 


BUN    16


 


Creatinine    1.6 H


 


Estimated GFR    42


 


BUN/Creatinine Ratio    10


 


Glucose    87


 


POC Glucose  81  97 


 


Calcium    8.0 L


 


Total Bilirubin    0.20


 


AST    16


 


ALT    9


 


Alkaline Phosphatase    40


 


Total Protein    5.6 L D


 


Albumin    3.1 L


 


Albumin/Globulin Ratio    1.2


 


TSH   


 


Urine Color   


 


Urine Turbidity   


 


Urine pH   


 


Ur Specific Gravity   


 


Urine Protein   


 


Urine Glucose (UA)   


 


Urine Ketones   


 


Urine Blood   


 


Urine Nitrite   


 


Urine Bilirubin   


 


Urine Urobilinogen   


 


Ur Leukocyte Esterase   


 


Urine WBC (Auto)   


 


Urine RBC (Auto)   


 


U Epithel Cells (Auto)   


 


Urine Bacteria (Auto)   


 


Urine Mucus   


 


Urine Eosinophils   


 


Urine Creatinine   


 


Urine Sodium   














  09/27/18 09/27/18 09/27/18





  04:38 06:14 11:28


 


Sodium   


 


Potassium   


 


Chloride   


 


Carbon Dioxide   


 


Anion Gap   


 


BUN   


 


Creatinine   


 


Estimated GFR   


 


BUN/Creatinine Ratio   


 


Glucose   


 


POC Glucose   74  105


 


Calcium   


 


Total Bilirubin   


 


AST   


 


ALT   


 


Alkaline Phosphatase   


 


Total Protein   


 


Albumin   


 


Albumin/Globulin Ratio   


 


TSH  3.160  


 


Urine Color   


 


Urine Turbidity   


 


Urine pH   


 


Ur Specific Gravity   


 


Urine Protein   


 


Urine Glucose (UA)   


 


Urine Ketones   


 


Urine Blood   


 


Urine Nitrite   


 


Urine Bilirubin   


 


Urine Urobilinogen   


 


Ur Leukocyte Esterase   


 


Urine WBC (Auto)   


 


Urine RBC (Auto)   


 


U Epithel Cells (Auto)   


 


Urine Bacteria (Auto)   


 


Urine Mucus   


 


Urine Eosinophils   


 


Urine Creatinine   


 


Urine Sodium   














  09/27/18 09/27/18 09/27/18





  Unknown Unknown Unknown


 


Sodium   


 


Potassium   


 


Chloride   


 


Carbon Dioxide   


 


Anion Gap   


 


BUN   


 


Creatinine   


 


Estimated GFR   


 


BUN/Creatinine Ratio   


 


Glucose   


 


POC Glucose   


 


Calcium   


 


Total Bilirubin   


 


AST   


 


ALT   


 


Alkaline Phosphatase   


 


Total Protein   


 


Albumin   


 


Albumin/Globulin Ratio   


 


TSH   


 


Urine Color  Yellow  


 


Urine Turbidity  Slightly-cloudy  


 


Urine pH  5.0  


 


Ur Specific Gravity  1.010  


 


Urine Protein  30 mg/dl  


 


Urine Glucose (UA)  Neg  


 


Urine Ketones  Neg  


 


Urine Blood  Neg  


 


Urine Nitrite  Neg  


 


Urine Bilirubin  Neg  


 


Urine Urobilinogen  < 2.0  


 


Ur Leukocyte Esterase  Neg  


 


Urine WBC (Auto)  1.0  


 


Urine RBC (Auto)  < 1.0  


 


U Epithel Cells (Auto)  3.0  


 


Urine Bacteria (Auto)  4+  


 


Urine Mucus  Few  


 


Urine Eosinophils    None seen


 


Urine Creatinine   79.9 H 


 


Urine Sodium   57 














Assessment and Plan


Pt seen and examined.  Agree with note above.  Pt with chronic constipation 

with diarrhea after taking multiple laxatives.  Now without bm x 2 days.  ct 

findings reviewed.  can complete empiric course of abx, although without 

diarrhea, unsure if this is necessary.  She has a GI physician she sees outside 

of the hospital, and can f/u with them after discharge for chronic gi symptoms.

  further recommendations as above.  will sign off, please call as needed or 

with questions.

## 2018-09-27 NOTE — PROGRESS NOTE
Assessment and Plan


Assessment and plan: 


Patient is a 48-year-old woman with a history of hypertension, mitral valve 

prolapse, Cushing syndrome, transverse myelitis with chronic constipation, and 

previous surgical history of pituitary gland removal who pw N/V/D. She was 

admitted to telemetry due to hypotension. 





* CT ABDOMEN PELVIS WO CON IMPRESSION: Air-fluid levels in the colon can be 

seen with diarrhea producing illness. No bowel obstruction or inflammation. 

Gallbladder distention. Asymmetric breast tissue. This could be due to 

incomplete imaging of one of the breasts. Correlate clinically. 





ARF, vasomotor nephropathy with ATN: treat with IVF, Nephrology is following, 

renal u/s pending


Hypotension with volume depletion: treat with ivf


Diarrhea; check for c.diff not sent because diarrhea stopped


SIRS, with suspected sepsis due to bacterial colitis: start iv abx, consult GI


N/V/D with AGE: treat symptomatically


Cushing syndrome: on Korlym by Dr. Hernandez, will not restart due to low bp





I called Dr. Humphrey's office, NS at [facility] [phone] and I was transferred 

to his PA, Mary. They removed the pituitary gland but they are following [facility] 

MRI q6 months and they have on any medications. Now she see Dr. Pizano [phone], Endocrinologist (office closed)





History


Interval history: 


Patient was seen and examined. Follow-up on current diagnosis of n/v/d, 

diarrhea resolved. Overnight uneventful. Patient denies any chest pain, 

shortness breath, or severe headaches. Imaging, nursing note, chart, labs and 

old chart reviewed. Discussed with patient. 





Hospitalist Physical





- Physical exam


Narrative exam: 


GEN: ill appearing,  NAD, Awake, Alert, Orientated 


HEENT: NCAT, EOMI, PERRL, OP Clear 


NECK: supple, no adenopathy, no thyromegaly, no JVD 


CVS/HEART: RRR, normal S1S2, pulses present bilaterally 


CHEST/LUNGS: CTA B, Symmetrical chest expansion, good air entry bilaterally 


GI/Abdomen: soft, NTND, good bowel sounds, no guarding or rebound 


/Bladder: no suprapubic tenderness, no CVA or paraspinal tenderness 


EXT/Skin: no c/c/e, no obvious rash 


MSK: FROM x 4 


Neuro: CN 2-12 grossly intact, no new focal deficits 


Psych: calm








- Constitutional


Vitals: 


 











Temp Pulse Resp BP Pulse Ox


 


 98.3 F   79   18   121/75   97 


 


 09/27/18 11:28  09/27/18 11:28  09/27/18 11:28  09/27/18 11:28  09/27/18 11:28














Results





- Labs


CBC & Chem 7: 


 09/25/18 17:24





 09/27/18 04:38


Labs: 


 Laboratory Last Values











WBC  14.0 K/mm3 (4.5-11.0)  H  09/25/18  17:24    


 


RBC  5.07 M/mm3 (3.65-5.03)  H  09/25/18  17:24    


 


Hgb  14.5 gm/dl (10.1-14.3)  H  09/25/18  17:24    


 


Hct  44.5 % (30.3-42.9)  H  09/25/18  17:24    


 


MCV  88 fl (79-97)   09/25/18  17:24    


 


MCH  29 pg (28-32)   09/25/18  17:24    


 


MCHC  33 % (30-34)   09/25/18  17:24    


 


RDW  14.7 % (13.2-15.2)   09/25/18  17:24    


 


Plt Count  253 K/mm3 (140-440)   09/25/18  17:24    


 


Lymph % (Auto)  14.9 % (13.4-35.0)   09/25/18  17:24    


 


Mono % (Auto)  4.3 % (0.0-7.3)   09/25/18  17:24    


 


Eos % (Auto)  0.6 % (0.0-4.3)   09/25/18  17:24    


 


Baso % (Auto)  0.4 % (0.0-1.8)   09/25/18  17:24    


 


Lymph #  2.1 K/mm3 (1.2-5.4)   09/25/18  17:24    


 


Mono #  0.6 K/mm3 (0.0-0.8)   09/25/18  17:24    


 


Eos #  0.1 K/mm3 (0.0-0.4)   09/25/18  17:24    


 


Baso #  0.1 K/mm3 (0.0-0.1)   09/25/18  17:24    


 


Seg Neutrophils %  79.8 % (40.0-70.0)  H  09/25/18  17:24    


 


Seg Neutrophils #  11.2 K/mm3 (1.8-7.7)  H  09/25/18  17:24    


 


Sodium  145 mmol/L (137-145)   09/27/18  04:38    


 


Potassium  4.2 mmol/L (3.6-5.0)   09/27/18  04:38    


 


Chloride  114.3 mmol/L ()  H  09/27/18  04:38    


 


Carbon Dioxide  21 mmol/L (22-30)  L  09/27/18  04:38    


 


Anion Gap  14 mmol/L  09/27/18  04:38    


 


BUN  16 mg/dL (7-17)   09/27/18  04:38    


 


Creatinine  1.6 mg/dL (0.7-1.2)  H  09/27/18  04:38    


 


Estimated GFR  42 ml/min  09/27/18  04:38    


 


BUN/Creatinine Ratio  10 %  09/27/18  04:38    


 


Glucose  87 mg/dL ()   09/27/18  04:38    


 


POC Glucose  105  ()   09/27/18  11:28    


 


Calcium  8.0 mg/dL (8.4-10.2)  L  09/27/18  04:38    


 


Total Bilirubin  0.20 mg/dL (0.1-1.2)   09/27/18  04:38    


 


AST  16 units/L (5-40)   09/27/18  04:38    


 


ALT  9 units/L (7-56)   09/27/18  04:38    


 


Alkaline Phosphatase  40 units/L ()   09/27/18  04:38    


 


Total Creatine Kinase  145 units/L ()  H  09/25/18  17:24    


 


CK-MB (CK-2)  2.5 ng/mL (0.0-4.0)   09/25/18  17:24    


 


CK-MB (CK-2) Rel Index  1.7  (0-4)   09/25/18  17:24    


 


Troponin T  < 0.010 ng/mL (0.00-0.029)   09/25/18  17:24    


 


Total Protein  5.6 g/dL (6.3-8.2)  L D 09/27/18  04:38    


 


Albumin  3.1 g/dL (3.9-5)  L  09/27/18  04:38    


 


Albumin/Globulin Ratio  1.2 %  09/27/18  04:38    


 


Lipase  26 units/L (13-60)   09/25/18  17:24    


 


TSH  3.160 mlU/mL (0.270-4.200)   09/27/18  04:38    


 


Urine Color  Yellow  (Yellow)   09/27/18  Unknown


 


Urine Turbidity  Slightly-cloudy  (Clear)   09/27/18  Unknown


 


Urine pH  5.0  (5.0-7.0)   09/27/18  Unknown


 


Ur Specific Gravity  1.010  (1.003-1.030)   09/27/18  Unknown


 


Urine Protein  30 mg/dl mg/dL (Negative)   09/27/18  Unknown


 


Urine Glucose (UA)  Neg mg/dL (Negative)   09/27/18  Unknown


 


Urine Ketones  Neg mg/dL (Negative)   09/27/18  Unknown


 


Urine Blood  Neg  (Negative)   09/27/18  Unknown


 


Urine Nitrite  Neg  (Negative)   09/27/18  Unknown


 


Urine Bilirubin  Neg  (Negative)   09/27/18  Unknown


 


Urine Urobilinogen  < 2.0 mg/dL (<2.0)   09/27/18  Unknown


 


Ur Leukocyte Esterase  Neg  (Negative)   09/27/18  Unknown


 


Urine WBC (Auto)  1.0 /HPF (0.0-6.0)   09/27/18  Unknown


 


Urine RBC (Auto)  < 1.0 /HPF (0.0-6.0)   09/27/18  Unknown


 


U Epithel Cells (Auto)  3.0 /HPF (0-13.0)   09/27/18  Unknown


 


Urine Bacteria (Auto)  4+ /HPF (Negative)   09/27/18  Unknown


 


Urine Mucus  Few /HPF  09/27/18  Unknown


 


Urine Eosinophils  None seen  (None Seen)   09/27/18  Unknown


 


Urine Creatinine  79.9 mg/dL (0.1-20.0)  H  09/27/18  Unknown


 


Urine Sodium  57 mmol/L  09/27/18  Unknown

## 2018-09-28 VITALS — DIASTOLIC BLOOD PRESSURE: 80 MMHG | SYSTOLIC BLOOD PRESSURE: 129 MMHG

## 2018-09-28 LAB
ALBUMIN SERPL-MCNC: 3.1 G/DL (ref 3.9–5)
ALT SERPL-CCNC: 7 UNITS/L (ref 7–56)
BUN SERPL-MCNC: 12 MG/DL (ref 7–17)
BUN/CREAT SERPL: 9 %
CALCIUM SERPL-MCNC: 8 MG/DL (ref 8.4–10.2)
HEMOLYSIS INDEX: 21

## 2018-09-28 RX ADMIN — PREGABALIN SCH MG: 25 CAPSULE ORAL at 14:29

## 2018-09-28 RX ADMIN — METRONIDAZOLE SCH: 5 INJECTION, SOLUTION INTRAVENOUS at 14:30

## 2018-09-28 RX ADMIN — Medication SCH EACH: at 10:07

## 2018-09-28 RX ADMIN — PREGABALIN SCH MG: 75 CAPSULE ORAL at 10:07

## 2018-09-28 RX ADMIN — PREGABALIN SCH MG: 75 CAPSULE ORAL at 14:29

## 2018-09-28 RX ADMIN — PREGABALIN SCH MG: 25 CAPSULE ORAL at 10:08

## 2018-09-28 RX ADMIN — DULOXETINE HYDROCHLORIDE SCH MG: 30 CAPSULE, DELAYED RELEASE ORAL at 10:08

## 2018-09-28 RX ADMIN — SODIUM CHLORIDE SCH MLS/HR: 0.9 INJECTION, SOLUTION INTRAVENOUS at 10:18

## 2018-09-28 RX ADMIN — ONDANSETRON PRN MG: 2 INJECTION INTRAMUSCULAR; INTRAVENOUS at 07:13

## 2018-09-28 RX ADMIN — HEPARIN SODIUM SCH UNIT: 5000 INJECTION, SOLUTION INTRAVENOUS; SUBCUTANEOUS at 10:08

## 2018-09-28 RX ADMIN — ASPIRIN SCH MG: 325 TABLET, COATED ORAL at 10:07

## 2018-09-28 RX ADMIN — SPIRONOLACTONE SCH MG: 25 TABLET ORAL at 10:07

## 2018-09-28 RX ADMIN — LEVOTHYROXINE SODIUM SCH MCG: 0.1 TABLET ORAL at 06:35

## 2018-09-28 RX ADMIN — METRONIDAZOLE SCH MLS/HR: 5 INJECTION, SOLUTION INTRAVENOUS at 06:35

## 2018-09-28 RX ADMIN — LORATADINE SCH MG: 10 TABLET ORAL at 10:08

## 2018-09-28 RX ADMIN — PANTOPRAZOLE SODIUM SCH MG: 40 TABLET, DELAYED RELEASE ORAL at 10:07

## 2018-09-28 NOTE — XRAY REPORT
AP ABDOMEN:



HISTORY: nausea and vomiting.



The abdominal gas pattern is unremarkable.  No masses or

organomegaly is identified and there is no gross evidence of free air 

or fluid.  No significant soft tissue calcifications are noted. A vena 

cava filter is in place.



IMPRESSION:

Unremarkable abdomen.

## 2018-09-28 NOTE — PROGRESS NOTE
Subjective


Principal diagnosis: lilliana


Interval history: 


Patient was seen today for follow-up on multiple renal related issues


Events of this hospitalization noted


Patient denies having any chest pain pressure or shortness of breath





Vitals labs intake output medications were reviewed





Social history: Reviewed





Allergies: Reviewed





Family history: Reviewed





Physical examination





HEENT: Oral mucosa moist no pallor or icterus


Neck: Supple no JVD


Chest: Clear to auscultation anteriorly


CVS: Regular rate and rhythm S1 and S2 heard


Abdomen: Soft nontender no suprapubic masses no organomegaly appreciable


Extremity: Dry skin less than 1+ peripheral edema


Musculoskeletal: No joint effusion noted in knees and ankle


Neurological: Alert awake


Dermatology: No petechial rashes


Psychiatry: No evidence of any agitation and aggression noted





Assessment and plan


No failure?  Acute?  Underlying chronic kidney disease: Felt to be due to 

dehydration hypoperfusion creatinine is currently stable patient does have 

echogenic kidney, monitor renal function


Likely has chronic kidney disease based on the fact that renal ultrasonogram 

shows increased echogenicity


Creatinine was 1.1 in 2015, that was then


Renal function is currently improving creatinine 1.4 bicarbonate 23 potassium 

3.9 urinalysis no evidence of any significant proteinuria hematuria


Simple renal cyst noted on the ultrasonogram


Most of this appears to be resulting from volume depletion nausea vomiting 

diarrhea


Patient was also hypotensive upon arrival which can cause acute tubular injury


And new the current treatment plan upon discharge will need to follow-up in the 

office


Overall patient is stable from renal standpoint for discharge


Patient was adequately counseled and educated regarding multiple renal related 

issues


Pertinent lab findings were discussed with patient, patient does exhibit good 

understanding of renal issues


We'll continue to follow and make recommendation from renal standpoint








Objective





- Vital Signs


Vital signs: 


 Vital Signs - 12hr











  09/27/18 09/28/18 09/28/18





  23:20 00:35 00:39


 


Temperature   97.6 F


 


Pulse Rate 74  77


 


Respiratory   17





Rate   


 


Blood Pressure  125/77 


 


Blood Pressure   125/77





[Right]   


 


O2 Sat by Pulse   97





Oximetry   














  09/28/18 09/28/18 09/28/18





  04:43 04:55 08:40


 


Temperature 98.9 F 98.5 F 98.5 F


 


Pulse Rate 70 71 74


 


Respiratory 18 18 18





Rate   


 


Blood Pressure 120/71  126/74


 


Blood Pressure  120/71 





[Right]   


 


O2 Sat by Pulse 94 98 95





Oximetry   














- Lab





 09/25/18 17:24





 09/28/18 05:05


 Most recent lab results











Calcium  8.0 mg/dL (8.4-10.2)  L  09/28/18  05:05    


 


Urine Creatinine  79.9 mg/dL (0.1-20.0)  H  09/27/18  Unknown


 


Urine Sodium  57 mmol/L  09/27/18  Unknown

## 2018-09-28 NOTE — DISCHARGE SUMMARY
Providers





- Providers


Date of Admission: 


09/25/18 23:07





Date of discharge: 09/28/18


Attending physician: 


BRISEIDA BELTRE





 





09/26/18 17:11


Consult to Physician [CONS] Routine 


   Comment: 


   Consulting Provider: ISMAEL COUCH


   Physician Instructions: 


   Reason For Exam: Evaluate for colitis





09/26/18 23:00


Consult to Physician [CONS] Routine 


   Comment: 


   Consulting Provider: JODY ALFORD


   Physician Instructions: 


   Reason For Exam: MIKIE











Primary care physician: 


PRIMARY CARE MD








Hospitalization


Condition: Stable


Hospital course: 


Patient is a 48-year-old woman with a history of hypertension, mitral valve 

prolapse, refractory Cushing syndrome s/p multiple surgeries on Korlym 600 mg 

with Aldactone 25mg, leg DVT, brain meniingioma, transverse myelitis with 

chronic constipation, and previous surgical history of pituitary gland removal 

who pw N/V/D. She was admitted to telemetry due to hypotension. 





* CT ABDOMEN PELVIS WO CON IMPRESSION: Air-fluid levels in the colon can be 

seen with diarrhea producing illness. No bowel obstruction or inflammation. 

Gallbladder distention. Asymmetric breast tissue. This could be due to 

incomplete imaging of one of the breasts. Correlate clinically. 





ARF, vasomotor nephropathy with ATN, resolved


Hypotension resolved


Diarrhea, related to AGE, resolved


SIRS noninfectious and poa and suspected sepsis due to bacterial colitis ruled 

out


N/V/D with AGE: treat symptomatically


Cushing syndrome followed by Dr. Pizano





d/w TED [facility] HETAL [facility] at [facility] [phone] and Endocrinologist, Dr. Pizano [phone] 


Disposition: DC-01 TO HOME OR SELFCARE


Time spent for discharge: 35 minutes





Core Measure Documentation





- Palliative Care


Palliative Care/ Comfort Measures: Not Applicable





- Core Measures


Any of the following diagnoses?: none





- VTE Discharge Requirements


Deep Vein Thrombosis/Pulmonary Embolism Present on Admission: No


Has pt received <5 days of overlap therapy or INR<2.0: No


Anticoagulant overlap therapy prescribed at discharge: No


Contraindication No Overlap Therapy order at DC: Not Indicated





Exam





- Physical Exam


Narrative exam: 


GEN: ill appearing,  NAD, Awake, Alert, Orientated 


HEENT: NCAT, EOMI, PERRL, OP Clear 


NECK: supple, no adenopathy, no thyromegaly, no JVD 


CVS/HEART: RRR, normal S1S2, pulses present bilaterally 


CHEST/LUNGS: CTA B, Symmetrical chest expansion, good air entry bilaterally 


GI/Abdomen: soft, NTND, good bowel sounds, no guarding or rebound 


/Bladder: no suprapubic tenderness, no CVA or paraspinal tenderness 


EXT/Skin: no c/c/e, no obvious rash 


MSK: FROM x 4 


Neuro: CN 2-12 grossly intact, no new focal deficits 


Psych: calm








- Constitutional


Vitals: 


 











Temp Pulse Resp BP Pulse Ox


 


 98.5 F   67   16   129/80   96 


 


 09/28/18 11:38  09/28/18 11:38  09/28/18 11:38  09/28/18 11:38  09/28/18 11:38














Plan


Activity: other (no strenous activity unless cleared by PCP)


Diet: diabetic


Special Instructions: record blood sugar diary (achs)


Additional Instructions: see Dr. Pizano as soon as possible


Follow up with: 


PRIMARY CARE,MD [Primary Care Provider] - 7 Days


JODY ALFORD MD [Staff Physician] - 7 Days


ISMAEL COUCH MD [Staff Physician] - 7 Days